# Patient Record
Sex: FEMALE | Race: WHITE | Employment: PART TIME | URBAN - METROPOLITAN AREA
[De-identification: names, ages, dates, MRNs, and addresses within clinical notes are randomized per-mention and may not be internally consistent; named-entity substitution may affect disease eponyms.]

---

## 2019-04-25 ENCOUNTER — HOSPITAL ENCOUNTER (EMERGENCY)
Age: 19
Discharge: HOME | End: 2019-04-26
Attending: EMERGENCY MEDICINE
Payer: COMMERCIAL

## 2019-04-25 DIAGNOSIS — R51.9 ACUTE NONINTRACTABLE HEADACHE, UNSPECIFIED HEADACHE TYPE: ICD-10-CM

## 2019-04-25 DIAGNOSIS — N94.6 CRAMPY PAIN ASSOCIATED WITH MENSES: ICD-10-CM

## 2019-04-25 DIAGNOSIS — J06.9 ACUTE UPPER RESPIRATORY INFECTION: Primary | ICD-10-CM

## 2019-04-25 DIAGNOSIS — S06.0X0A CONCUSSION WITHOUT LOSS OF CONSCIOUSNESS, INITIAL ENCOUNTER: ICD-10-CM

## 2019-04-25 LAB
ANION GAP SERPL CALCULATED.3IONS-SCNC: 12 MMOL/L (ref 3–16)
BASOPHILS ABSOLUTE: 0 K/UL (ref 0–0.2)
BASOPHILS RELATIVE PERCENT: 0.5 %
BILIRUBIN URINE: ABNORMAL
BLOOD, URINE: ABNORMAL
BUN BLDV-MCNC: 9 MG/DL (ref 7–20)
CALCIUM SERPL-MCNC: 8.7 MG/DL (ref 8.3–10.6)
CHLORIDE BLD-SCNC: 104 MMOL/L (ref 99–110)
CLARITY: ABNORMAL
CO2: 24 MMOL/L (ref 21–32)
COLOR: ABNORMAL
COMMENT UA: ABNORMAL
CREAT SERPL-MCNC: 0.6 MG/DL (ref 0.6–1.1)
CRYSTALS, UA: ABNORMAL /HPF
EOSINOPHILS ABSOLUTE: 0.2 K/UL (ref 0–0.6)
EOSINOPHILS RELATIVE PERCENT: 2.8 %
EPITHELIAL CELLS, UA: 7 /HPF (ref 0–5)
GFR AFRICAN AMERICAN: >60
GFR NON-AFRICAN AMERICAN: >60
GLUCOSE BLD-MCNC: 118 MG/DL (ref 70–99)
GLUCOSE URINE: NEGATIVE MG/DL
HCG(URINE) PREGNANCY TEST: NEGATIVE
HCT VFR BLD CALC: 41 % (ref 36–48)
HEMOGLOBIN: 13.6 G/DL (ref 12–16)
HYALINE CASTS: 12 /LPF (ref 0–8)
KETONES, URINE: ABNORMAL MG/DL
LEUKOCYTE ESTERASE, URINE: NEGATIVE
LYMPHOCYTES ABSOLUTE: 0.6 K/UL (ref 1–5.1)
LYMPHOCYTES RELATIVE PERCENT: 7 %
MCH RBC QN AUTO: 29.8 PG (ref 26–34)
MCHC RBC AUTO-ENTMCNC: 33.2 G/DL (ref 31–36)
MCV RBC AUTO: 89.9 FL (ref 80–100)
MICROSCOPIC EXAMINATION: YES
MONOCYTES ABSOLUTE: 1 K/UL (ref 0–1.3)
MONOCYTES RELATIVE PERCENT: 10.8 %
NEUTROPHILS ABSOLUTE: 7 K/UL (ref 1.7–7.7)
NEUTROPHILS RELATIVE PERCENT: 78.9 %
NITRITE, URINE: NEGATIVE
PDW BLD-RTO: 13.2 % (ref 12.4–15.4)
PH UA: 5.5 (ref 5–8)
PLATELET # BLD: 171 K/UL (ref 135–450)
PMV BLD AUTO: 9.7 FL (ref 5–10.5)
POTASSIUM REFLEX MAGNESIUM: 4.3 MMOL/L (ref 3.5–5.1)
PROTEIN UA: 30 MG/DL
RBC # BLD: 4.55 M/UL (ref 4–5.2)
RBC UA: 2 /HPF (ref 0–4)
SODIUM BLD-SCNC: 140 MMOL/L (ref 136–145)
SPECIFIC GRAVITY UA: >1.03 (ref 1–1.03)
URINE REFLEX TO CULTURE: YES
URINE TYPE: ABNORMAL
UROBILINOGEN, URINE: 1 E.U./DL
WBC # BLD: 8.9 K/UL (ref 4–11)
WBC UA: 9 /HPF (ref 0–5)
YEAST: PRESENT /HPF

## 2019-04-25 PROCEDURE — 80048 BASIC METABOLIC PNL TOTAL CA: CPT

## 2019-04-25 PROCEDURE — 85025 COMPLETE CBC W/AUTO DIFF WBC: CPT

## 2019-04-25 PROCEDURE — 87086 URINE CULTURE/COLONY COUNT: CPT

## 2019-04-25 PROCEDURE — 84703 CHORIONIC GONADOTROPIN ASSAY: CPT

## 2019-04-25 PROCEDURE — 81001 URINALYSIS AUTO W/SCOPE: CPT

## 2019-04-25 PROCEDURE — 99284 EMERGENCY DEPT VISIT MOD MDM: CPT

## 2019-04-25 RX ORDER — ACETAMINOPHEN 325 MG/1
650 TABLET ORAL ONCE
Status: COMPLETED | OUTPATIENT
Start: 2019-04-26 | End: 2019-04-26

## 2019-04-25 RX ORDER — IBUPROFEN 400 MG/1
800 TABLET ORAL ONCE
Status: COMPLETED | OUTPATIENT
Start: 2019-04-26 | End: 2019-04-26

## 2019-04-25 ASSESSMENT — PAIN SCALES - GENERAL: PAINLEVEL_OUTOF10: 0

## 2019-04-26 VITALS
BODY MASS INDEX: 21.45 KG/M2 | HEIGHT: 65 IN | SYSTOLIC BLOOD PRESSURE: 108 MMHG | HEART RATE: 75 BPM | TEMPERATURE: 98 F | WEIGHT: 128.75 LBS | RESPIRATION RATE: 18 BRPM | DIASTOLIC BLOOD PRESSURE: 77 MMHG | OXYGEN SATURATION: 100 %

## 2019-04-26 PROCEDURE — 6370000000 HC RX 637 (ALT 250 FOR IP): Performed by: EMERGENCY MEDICINE

## 2019-04-26 RX ADMIN — IBUPROFEN 800 MG: 400 TABLET, FILM COATED ORAL at 00:26

## 2019-04-26 RX ADMIN — ACETAMINOPHEN 650 MG: 325 TABLET ORAL at 00:26

## 2019-04-26 ASSESSMENT — PAIN SCALES - GENERAL
PAINLEVEL_OUTOF10: 0
PAINLEVEL_OUTOF10: 0

## 2019-04-26 NOTE — ED PROVIDER NOTES
Emergency Physician Note    Chief Complaint  Dizziness (\"HOT FLASH\" FROM MY HEAD ALL THE WAY DOWN MY BODY\" lasted a few secs and x 1 episode approx. 1 hour pta.); URI; Nasal Congestion (RUNNY NOSE AND COLD S/S X 2 DAYS); Abdominal Cramping (SEVERE MENSTRUAL CRAMPS SINCE YESTERDAY); and Nausea       History of Present Illness  Fausto Alvarenga is a 22 y.o. presurgical female to male transgender who presents to the ED for complaints. Patient states the past 2 days he's had an upper tract or upper respiratory infection-like lose concern is congestion and cough congestion with a nonproductive cough. Today when he was walking he had a episode where she felt a hot flash started at the top of her head and radiated down through his entire body, lasted for about 5 seconds and he has not had a repeat of the shoulder. When this was happening he denies any extremity weakness, vision changes, a subtle droop, slurred speech. Shortly after arrival he developed a headache he describes as a tension headache across her forehead, he has had very similar headaches in the past.  Patient also reports he gets a menstrual period once a month and when she gets her period every once in a while the menstrual Incredibly Bad That Described As Cramps in Her Lower Back in Her Abdomen. he Currently Not Experiencing the Menstrual Cramps at This Time She Is Just Concerned That Sometimes they are bad and Sometimes Not.  he also reports that frequently for dancingshe is moving her head around a lot sometimes hitting it softly on the floor and he will frequently have headaches that last for days afterwards. he is concerned because his friend who is at the bedside will do similar dance moves yet will not develop headaches. Denies fever, chills, malaise, chest pain, shortness of breath, cough, abdominal pain, nausea, vomiting, diarrhea,  sore throat, dysuria, back pain, rash. No palliative/provocative factors.        Positives and pertinent truncal ataxia. Test skew:  Negative. Nystamus:  Negative.   PSYCHIATRIC: Not anxious, normal mood and affect, thoughts are linear and organized, without delusions/hallucinations, responds appropriately to questions      LABS and DIAGNOSTIC RESULTS    LABS  Results for orders placed or performed during the hospital encounter of 04/25/19   Urine, reflex to culture   Result Value Ref Range    Color, UA DK YELLOW Straw/Yellow    Clarity, UA CLOUDY (A) Clear    Glucose, Ur Negative Negative mg/dL    Bilirubin Urine SMALL (A) Negative    Ketones, Urine TRACE (A) Negative mg/dL    Specific Gravity, UA >1.030 1.005 - 1.030    Blood, Urine LARGE (A) Negative    pH, UA 5.5 5.0 - 8.0    Protein, UA 30 (A) Negative mg/dL    Urobilinogen, Urine 1.0 <2.0 E.U./dL    Nitrite, Urine Negative Negative    Leukocyte Esterase, Urine Negative Negative    Microscopic Examination YES     Urine Reflex to Culture Yes     Urine Type Not Specified    Pregnancy, Urine   Result Value Ref Range    HCG(Urine) Pregnancy Test Negative Detects HCG level >20 MIU/mL   CBC Auto Differential   Result Value Ref Range    WBC 8.9 4.0 - 11.0 K/uL    RBC 4.55 4.00 - 5.20 M/uL    Hemoglobin 13.6 12.0 - 16.0 g/dL    Hematocrit 41.0 36.0 - 48.0 %    MCV 89.9 80.0 - 100.0 fL    MCH 29.8 26.0 - 34.0 pg    MCHC 33.2 31.0 - 36.0 g/dL    RDW 13.2 12.4 - 15.4 %    Platelets 898 100 - 669 K/uL    MPV 9.7 5.0 - 10.5 fL    Neutrophils % 78.9 %    Lymphocytes % 7.0 %    Monocytes % 10.8 %    Eosinophils % 2.8 %    Basophils % 0.5 %    Neutrophils # 7.0 1.7 - 7.7 K/uL    Lymphocytes # 0.6 (L) 1.0 - 5.1 K/uL    Monocytes # 1.0 0.0 - 1.3 K/uL    Eosinophils # 0.2 0.0 - 0.6 K/uL    Basophils # 0.0 0.0 - 0.2 K/uL   Basic Metabolic Panel w/ Reflex to MG   Result Value Ref Range    Sodium 140 136 - 145 mmol/L    Potassium reflex Magnesium 4.3 3.5 - 5.1 mmol/L    Chloride 104 99 - 110 mmol/L    CO2 24 21 - 32 mmol/L    Anion Gap 12 3 - 16    Glucose 118 (H) 70 - 99 mg/dL    BUN 9 7 - 20 mg/dL    CREATININE 0.6 0.6 - 1.1 mg/dL    GFR Non-African American >60 >60    GFR African American >60 >60    Calcium 8.7 8.3 - 10.6 mg/dL   Microscopic Urinalysis   Result Value Ref Range    Yeast, UA Present (A) /HPF    Crystals 2+ Ca. Oxalate (A) /HPF    Urinalysis Comments see below     Hyaline Casts, UA 12 (H) 0 - 8 /LPF    WBC, UA 9 (H) 0 - 5 /HPF    RBC, UA 2 0 - 4 /HPF    Epi Cells 7 (H) 0 - 5 /HPF       PROCEDURES      MEDICAL DECISION MAKING    Procedures/interventions/images ordered for this visit  Orders Placed This Encounter   Procedures    Urine Culture    Urine, reflex to culture    Pregnancy, Urine    CBC Auto Differential    Basic Metabolic Panel w/ Reflex to MG    Microscopic Urinalysis       Medications ordered for this visit  No orders of the defined types were placed in this encounter. I discussed the results of the urinalysis, he denies any vaginal discharge. I explained to the patient that due to his dance moves he is most likely experiencing congestion syndrome. Vital signs are stable. CT of the head was offered to rule out an intracranial emergency after the risks and benefits of the study were discussed with the patient or family. Since the patient did not get further imaging, I completed a structured, evidence-based clinical evaluation to screen for intracranial injury in this patient. The evidence indicates that the patient is very low risk for intracranial injury requiring surgical intervention, and this is consistent with my clinical intuition. The risk of further imaging or hospitalization is likely higher than the risk of the patient having an intracranial injury requiring surgical intervention. It is, therefore, in the patients best interest not to do additional emergent testing or be hospitalized.     I have discussed with the patient my clinical impression and the result of an evidence-based clinical evaluation to screen for intracranial injury, as well clinical impression and the result of an evidence-based clinical evaluation to screen for [facility], as well as the risk of further testing and hospitalization. The evidence shows that the risk for subarachnoid hemorrhage is less than 1/200 or 0.5%. Further testing involves either invasive angiogram with about a 1% risk of serious complications, or hospitalization, with about a 1% risk of serious complications. The patient understands the residual risk of SAH and the risk of further testing and declines further emergent evaluation or hospitalization for subarachnoid hemorrhage. In regards to the upper respiratory tract infection - On lung exam, patient does not have any wheezing, Rales, rhonchi. This appears viral and without significant evidence of severe respiratory distress, severe upper or lower airway compromise, hypoxemia, toxicity, shock, sepsis, hemodynamic or cardiopulmonary instability, epiglottitis, peritonsillar abscess, retropharyngeal abscess, bacterial tracheitis, pneumonia, or any disease process requiring other immediate surgical or medical intervention at this time. In regards to the menstrual cramps, I explained to him that this perfectly normal to have some cramps that are worse than other months. It is understood that if they are not improving as expected or if other new symptoms of concern develop, other etiologies or diagnoses may need to be considered requiring other tests, treatments, consultations, and/or admission. If there is any worsening or change of symptoms, including (but not limited to) worsening dizziness, visual changes, increased memory loss, difficulty with coordination or speech, they are to return to the ER immediately. The diagnosis, plan, expected course, follow-up, and return precautions were discussed and all questions were answered. Final Impression    1. Acute upper respiratory infection    2. Crampy pain associated with menses    3.  Acute nonintractable headache, unspecified headache type    4. Concussion without loss of consciousness, initial encounter        Blood pressure 116/79, pulse 86, temperature 98.2 °F (36.8 °C), temperature source Oral, resp. rate 18, height 5' 4.75\" (1.645 m), weight 128 lb 12 oz (58.4 kg), last menstrual period 04/23/2019, SpO2 100 %, not currently breastfeeding. Patient and/or companions verbalized understanding of the ED workup, any relevant findings as well as any incidental findings, and the disposition and plan. Questions sought and answered with the patient and/or family members. They voice understanding and agree with plan. If the patient was discharged from the ED, they were instructed to return for any worsening or worrisome concerns. Patient was given scripts for the following medications. I counseled patient how to take these medications. New Prescriptions    No medications on file       Disposition  Pt is in stable condition upon Discharge to home. The note was completed using Dragon voice recognition transcription. Every effort was made to ensure accuracy; however, inadvertent transcription errors may be present despite my best efforts to edit errors.     Adrian Chahal MD  [facility]        Adrian Chahal MD  04/25/19 [id number] Oleg Gonzalez MD  04/26/19 0000

## 2019-04-27 LAB — URINE CULTURE, ROUTINE: NORMAL

## 2019-04-27 NOTE — RESULT ENCOUNTER NOTE
Culture result reviewed, no further treatment needed. Final urine culture results shows 50,000 colonies of mixed klever. The patient did not have symptoms of urinary tract infection was not started on antibiotics. No changes needed.